# Patient Record
Sex: FEMALE | Race: WHITE | NOT HISPANIC OR LATINO | URBAN - METROPOLITAN AREA
[De-identification: names, ages, dates, MRNs, and addresses within clinical notes are randomized per-mention and may not be internally consistent; named-entity substitution may affect disease eponyms.]

---

## 2022-07-30 ENCOUNTER — TELEPHONE (OUTPATIENT)
Dept: URGENT CARE | Facility: CLINIC | Age: 37
End: 2022-07-30

## 2022-08-09 ENCOUNTER — OFFICE VISIT (OUTPATIENT)
Dept: ORTHOPEDIC SURGERY | Facility: CLINIC | Age: 37
End: 2022-08-09

## 2022-08-09 VITALS
DIASTOLIC BLOOD PRESSURE: 82 MMHG | HEIGHT: 66 IN | BODY MASS INDEX: 47.09 KG/M2 | WEIGHT: 293 LBS | SYSTOLIC BLOOD PRESSURE: 132 MMHG

## 2022-08-09 DIAGNOSIS — M75.51 BURSITIS OF RIGHT SHOULDER: ICD-10-CM

## 2022-08-09 DIAGNOSIS — M75.41 IMPINGEMENT SYNDROME OF RIGHT SHOULDER: ICD-10-CM

## 2022-08-09 DIAGNOSIS — M75.01 ADHESIVE CAPSULITIS OF RIGHT SHOULDER: Primary | ICD-10-CM

## 2022-08-09 PROCEDURE — 99204 OFFICE O/P NEW MOD 45 MIN: CPT | Performed by: ORTHOPAEDIC SURGERY

## 2022-08-09 RX ORDER — METHYLPREDNISOLONE 4 MG/1
TABLET ORAL
Qty: 1 EACH | Refills: 0 | Status: SHIPPED | OUTPATIENT
Start: 2022-08-09

## 2022-08-09 NOTE — PROGRESS NOTES
[facility] Orthopaedic Surgery Office Visit - Reji Tyler MD    Office Visit       Patient Name: Porsha Medina    Chief Complaint:   Chief Complaint   Patient presents with   • Right Shoulder - Pain       Referring Physician: Dottie Alanis A* -- I appreciate the referral    History of Present Illness:   Porsha Medina is a 36 y.o. female who presents with right body part: shoulder Reason: pain.  Onset:Onset: moving furniture. The issue has been ongoing for 2 week(s). Pain is a 5/10 on the pain scale. Pain is described as Pain Characterization: burning. Associated symptoms include Symptoms: pain and swelling. The pain is worse with working and lying on affected side; resting, ice and pain medication and/or NSAID improve the pain. Previous treatments have included: NSAIDS and oral steroids. I have reviewed the patient's history of present illness as noted/entered above.    I have reviewed the patient's past medical history, surgical history, social history, family history, medications, and allergies as noted in the electronic medical record and as noted/entered.  I have reviewed the patient's review of systems as noted/enter and updated as noted in the patient's HPI.      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  Negative for chills, fatigue and fever.   HENT: Negative.  Negative for congestion and dental problem.    Eyes: Negative.  Negative for blurred vision.   Respiratory: Negative.  Negative for shortness of breath.     Cardiovascular: Negative.  Negative for leg swelling.   Gastrointestinal: Negative.  Negative for abdominal pain.   Endocrine: Negative.  Negative for polyuria.   Genitourinary: Negative.  Negative for difficulty urinating.   Musculoskeletal: Positive for arthralgias.   Skin: Negative.    Allergic/Immunologic: Negative.    Neurological: Negative.    Hematological: Negative.  Negative for adenopathy.   Psychiatric/Behavioral: Negative.  Negative for behavioral problems.        Past Medical History:   Past Medical History:   Diagnosis Date   • Hashimoto's thyroiditis        Past Surgical History:   Past Surgical History:   Procedure Laterality Date   • WISDOM TOOTH EXTRACTION         Family History: No family history on file.    Social History:   Social History     Socioeconomic History   • Marital status: Unknown   Tobacco Use   • Smoking status: Never Smoker   • Smokeless tobacco: Never Used       Medications:   Current Outpatient Medications:   •  Cyanocobalamin (B-12 COMPLIANCE INJECTION IJ), Inject  as directed Every 30 (Thirty) Days., Disp: , Rfl:   •  cyanocobalamin 1000 MCG/ML injection, cyanocobalamin (vit B-12) 1,000 mcg/mL injection solution  inject 1 ML once a month x 11 months, Disp: , Rfl:   •  folic acid (FOLVITE) 1 MG tablet, folic acid 1 mg tablet  Take 1 tablet every day by oral route for 30 days., Disp: , Rfl:   •  labetalol (NORMODYNE) 200 MG tablet, labetalol 200 mg tablet  TAKE 1 TABLET BY MOUTH TWICE A DAY, Disp: , Rfl:   •  levothyroxine (SYNTHROID, LEVOTHROID) 50 MCG tablet, Take 50 mcg by mouth Daily., Disp: , Rfl:   •  sertraline (ZOLOFT) 50 MG tablet, sertraline 50 mg tablet  Take 1 tablet every day by oral route for 90 days., Disp: , Rfl:   •  methylPREDNISolone (MEDROL) 4 MG dose pack, Use as directed by package instructions, Disp: 1 each, Rfl: 0    Allergies: No Known Allergies    The following portions of the patient's history were reviewed and updated as appropriate: allergies,  "current medications, past family history, past medical history, past social history, past surgical history and problem list.        Objective    Objective      Vital Signs:   Vitals:    08/09/22 1458   BP: 132/82   Weight: 136 kg (300 lb)   Height: 167.6 cm (65.98\")       Ortho Exam:  RIGHT SHOULDER  General: no acute distress, comfortable  Vitals reviewed in chart    Musculoskeletal Exam    SIDE: Right  Shoulder Exam:  Range of motion measurements (degrees)  Forward flexion/Abduction/External rotation at side/ER at 90/IR at 90/IR position  Active: 130/130/30/70/40/buttock  Passive: 150/150/40/70/40/buttock    Diminished internal rotation and external rotation  Painful arc of motion  No evidence of septic joint  Pain with forward flexion and abduction greater than 120  Impingement testing Neer's test - positive/painful  Impingement testing Hawkin's test - positive/painful  Rotator cuff testing Tanya's test - mild pain but no obvious weakness, pain limited  Rotator cuff testing External rotation - no weakness  Rotator cuff testing Lag signs - absent  Rotator cuff testing Belly press - negative  Scapular dyskinesis - present, abnormal scapular motion      Results Review:   Imaging Results (Last 24 Hours)     ** No results found for the last 24 hours. **        XR Shoulder 2+ View Right    Result Date: 7/30/2022  Negative for acute osseous abnormality.  This report was finalized on 7/30/2022 10:07 AM by Francisco Christianson MD.        I reviewed interpret the x-rays above no acute bony findings noted.    Procedures     We will hold on injection at this time        Assessment / Plan      Assessment/Plan:   Problem List Items Addressed This Visit        Musculoskeletal and Injuries    Adhesive capsulitis of right shoulder - Primary    Relevant Medications    methylPREDNISolone (MEDROL) 4 MG dose pack    Other Relevant Orders    Ambulatory Referral to Physical Therapy Evaluate and treat, Ortho (Completed)    Impingement syndrome " of right shoulder    Relevant Medications    methylPREDNISolone (MEDROL) 4 MG dose pack    Other Relevant Orders    Ambulatory Referral to Physical Therapy Evaluate and treat, Ortho (Completed)    Bursitis of right shoulder    Relevant Medications    methylPREDNISolone (MEDROL) 4 MG dose pack    Other Relevant Orders    Ambulatory Referral to Physical Therapy Evaluate and treat, Ortho (Completed)          Right shoulder early frozen shoulder     selective rest/activity modifications recommended while the shoulder recovers, although stretching and physical therapy are encouraged.    Medrol dosepak - risks and benefit of this medication was discussed including risks with Diabetes and bump in blood glucose.  A 6-day steroid Medrol dosepak was recommended followed by an NSAID after the dosepak is finished.      NSAIDs - risks and benefits of these medications were discussed.  These medications are certainly not without risks, but they can provide relief of pain caused due to capsular inflammation with this condition.    Physical therapy prescription provided and stretching program discussed    Steroid injection - a steroid injection can be beneficial and was offered.  Risks and benefits were discussed including a bump in blood glucose in the case of Diabetes.    Follow-up - the patient can schedule an appointment for 2 months pending progress with the nonoperative treatment program    Patient counseling was performed with a discussion of the following:  What is a frozen shoulder?  Frozen Shoulder or Idiopathic Adhesive Capsulitis - the patient has a diagnosis of idiopathic adhesive capsulitis or “frozen shoulder.”  We discussed that this condition can have variable “freezing” and “thawing” phases that can be very painful.  Fortunately, this condition rarely requires operative intervention and responds to conservative measures gradually over time.  Unfortunately, I did  that the symptoms can last up to 6-12 months  in some patients and frozen shoulder can return to the same shoulder or impact the other shoulder in the future.    What is our plan to help nonoperatively treat frozen shoulder?  We discussed a plan for selective rest/activity modification, NSAIDs - risks and benefits of these medications discussed, frozen shoulder exercises demonstrated with emphasis on range of motion and physical therapy prescription given, and the option of a corticosteroid injection.  The patient may be a candidate for a 6-day Medrol dose pack and then start an NSAID after the Medrol dose pack is finished.      When will I see the patient back?  I will see the patient back in 2 months to follow-up their progress pending progress or sooner if needed.  If the patient is improved then they can call to cancel the appointment.  If the patient has improved range of motion, but continued pain, then we will look for other potential causes of shoulder pain at the follow-up appointment.  The dukes now is to improve the range of motion and gradually decrease the pain they are experiencing.    Is an MRI needed at this time?   I counseled the patient that an MRI will not diagnose a frozen shoulder, but an MRI is indicated in the patient is refractory to our nonoperative treatment program for frozen shoulder.  An MRI can help to look for other potential causes of shoulder pain pending response to nonoperative treatment.      Follow Up: 2 months        Reji Tyler MD, FAAOS  Orthopedic Surgeon  Fellowship Trained Shoulder and Elbow Surgeon  [facility]  Orthopedics and Sports Medicine  [address] [place] [address], [address]  [place] [address]    08/09/22  15:47 EDT

## 2025-06-09 ENCOUNTER — TRANSCRIBE ORDERS (OUTPATIENT)
Dept: ADMINISTRATIVE | Facility: HOSPITAL | Age: 40
End: 2025-06-09
Payer: COMMERCIAL

## 2025-06-09 DIAGNOSIS — Z12.31 VISIT FOR SCREENING MAMMOGRAM: Primary | ICD-10-CM
